# Patient Record
Sex: FEMALE | Race: ASIAN
[De-identification: names, ages, dates, MRNs, and addresses within clinical notes are randomized per-mention and may not be internally consistent; named-entity substitution may affect disease eponyms.]

---

## 2020-12-06 NOTE — NUR
D/C INSTRUCTIONS, MEDS & F/U APPT RV'WD WITH PT, SHE VERBALIZES UNDERSTANDING. 
INSTRUCTED PT ON CONTINUING QUARANTINE. PT AMBULATED OUT OF ED WITHOUT 
DIFFICULTY.

## 2021-03-08 NOTE — NUR
assumed care of pt.  pt here c/o HA x1 day.  pt reports that she has a hx of 
intermittent HA and that she is having lots of stress at home.  pt also reports 
that she has a hx of HTN and had not taken her HTN meds until 20 minutes PTA 
today.  no loss or change of vision.  no facial droop.  ambulatory and JOSHUA 
without diffiuclty



pt  at bedside

## 2021-08-30 ENCOUNTER — HOSPITAL ENCOUNTER (OUTPATIENT)
Dept: HOSPITAL 8 - CFH | Age: 61
Discharge: HOME | End: 2021-08-30
Attending: NURSE PRACTITIONER
Payer: COMMERCIAL

## 2021-08-30 DIAGNOSIS — I08.8: Primary | ICD-10-CM

## 2021-08-30 DIAGNOSIS — I11.9: ICD-10-CM

## 2021-08-30 PROCEDURE — 93306 TTE W/DOPPLER COMPLETE: CPT

## 2021-08-30 PROCEDURE — 93356 MYOCRD STRAIN IMG SPCKL TRCK: CPT

## 2024-04-17 NOTE — NUR
PT DENIES CHEST PAIN AT THIS TIME. STATES SHE FEELS CHEST PRESSURE/TIGHTNESS 
FOR THE PAST DAY OR TWO, MOSTLY D/T STRESS/DEPRESSION. REPORTS HER MOTHER  
YESTERDAY AND ALL HER FAMILY IS COVID+. STATES HER COVID SYMPTOMS HAVE MOSTLY 
RESOLVED, SHE JUST FEELS DEPRESSED. VSS. 'WD POC WITH PT. BLANKET PROVIDED, 
EMOTIONAL SUPPORT PROVIDED. 

-------------------------------------------------------------------------------

Addendum: 20 at 1042 by REY

-------------------------------------------------------------------------------

PT DENIES SI. STATES SHE DOES HAVE FAMILY SUPPORT AT HOME. IS NOT CURRENTLY 
SEEING A PSYCHIATRIST. 2